# Patient Record
(demographics unavailable — no encounter records)

---

## 2025-03-12 NOTE — HISTORY OF PRESENT ILLNESS
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 There is a h/o of a dilated aortic root of 42mm by CT in 2018. An echo done 3/10/25 reveals an Ao of 39mm and mild AS with mild to moderate AI.  He denies CP or SOB.   EKG: NSR @ 54 with 1st degree AVB and a RBBB. No ST-Tw abnormalities. 3/12/25

## 2025-03-12 NOTE — PHYSICAL EXAM
[Well Developed] : well developed [Well Nourished] : well nourished [No Acute Distress] : no acute distress [Normal Conjunctiva] : normal conjunctiva [Normal Venous Pressure] : normal venous pressure [No Carotid Bruit] : no carotid bruit [Normal S1, S2] : normal S1, S2 [No Rub] : no rub [No Gallop] : no gallop [Clear Lung Fields] : clear lung fields [Good Air Entry] : good air entry [No Respiratory Distress] : no respiratory distress  [Soft] : abdomen soft [Non Tender] : non-tender [No Masses/organomegaly] : no masses/organomegaly [Normal Bowel Sounds] : normal bowel sounds [Normal Gait] : normal gait [No Edema] : no edema [No Cyanosis] : no cyanosis [No Clubbing] : no clubbing [No Varicosities] : no varicosities [No Rash] : no rash [No Skin Lesions] : no skin lesions [Moves all extremities] : moves all extremities [No Focal Deficits] : no focal deficits [Normal Speech] : normal speech [Alert and Oriented] : alert and oriented [Normal memory] : normal memory [de-identified] : CESAR

## 2025-03-12 NOTE — ASSESSMENT
[FreeTextEntry1] : Bradycardia- BB stopped in [facility], now getting HRs of 125, got LH.  He took 25 mg of Toprol yesterday and today. HR 54 today with a 1st degree AVB and RBBB. This is indicative of diffuse conduction dz.  Will resume BB at 12.5 mg daily. Pt to get a monitor thru [facility].   RBBB- Chronic  HTN- BP controlled

## 2025-03-21 NOTE — ASSESSMENT
[FreeTextEntry1] : An EKG was performed to evaluate for arrhythmia and ischemia.  Patient was told to take his metoprolol tonight and not to self-adjust his medications. He was told to call if HR <50  I encouraged continued risk factor reduction and gradual increase in aerobic activity as tolerated  20   minutes were spent discussing cardiac risk excluding procedure time

## 2025-03-21 NOTE — HISTORY OF PRESENT ILLNESS
[FreeTextEntry1] : 83 year male who skipped his Metoprolol last night when his MR was 50-60 bpm. He then took it this morning. He comes for Holter attachment and requests to know whether to take his Metoprolol tonight

## 2025-04-01 NOTE — CARDIOLOGY SUMMARY
Spoke with patient regarding new medication, RX sent to pharmacy. Patient denies any further questions    [de-identified] : 4/1/25 SR @ 97 bpm, RBBB EKG: NSR @ 54 with 1st degree AVB and a RBBB. No ST-Tw abnormalities. 3/12/25 [de-identified] : CONCLUSIONS:  1. Left ventricular cavity is small. Left ventricular systolic function is normal with an ejection fraction of 75 % by Lopez's method of disks.  2. No left ventricular hypertrophy.  3. Normal right ventricular cavity size and normal right ventricular systolic function. Tricuspid annular plane systolic excursion (TAPSE) is 3.0 cm (normal >=1.7 cm).  4. Normal left and right atrial size.  5. Mild aortic stenosis.  6. Mild to moderate aortic regurgitation.  7. No pericardial effusion seen.

## 2025-04-01 NOTE — DISCUSSION/SUMMARY
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 with c/o dizziness and bradycardia.  Metoprolol 50 mg daily was held.  Now resumed at 12.5 mg daily.  He denies any recurrent dizziness.  No history of presyncope/syncope.     1.  Bradycardia  Sinus rhythm with RBBB, LAFB on ECG today  No recurrent dizziness.  No presyncope/syncope  F/U LTM results.  Given presence of bifascicular block, recommend biannual ambulatory telemetry to monitor for progression of conduction disease.  He will f/u with Dr. Cordova.

## 2025-04-01 NOTE — CARDIOLOGY SUMMARY
[de-identified] : 4/1/25 SR @ 97 bpm, RBBB EKG: NSR @ 54 with 1st degree AVB and a RBBB. No ST-Tw abnormalities. 3/12/25 [de-identified] : CONCLUSIONS:  1. Left ventricular cavity is small. Left ventricular systolic function is normal with an ejection fraction of 75 % by Lopez's method of disks.  2. No left ventricular hypertrophy.  3. Normal right ventricular cavity size and normal right ventricular systolic function. Tricuspid annular plane systolic excursion (TAPSE) is 3.0 cm (normal >=1.7 cm).  4. Normal left and right atrial size.  5. Mild aortic stenosis.  6. Mild to moderate aortic regurgitation.  7. No pericardial effusion seen.

## 2025-04-01 NOTE — HISTORY OF PRESENT ILLNESS
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 with c/o dizziness and bradycardia.  Metoprolol 50 mg daily was held.  Now resumed at 12.5 mg daily.  He denies any recurrent dizziness.  No history of presyncope/syncope.     Cardiac Monitor was ordered by Dr. Colunga -- due to be completed 4/4/25

## 2025-04-01 NOTE — HISTORY OF PRESENT ILLNESS
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  Metoprolol 50 mg daily was held.  Now resumed at 12.5 mg daily.  He denies any recurrent dizziness.  No history of presyncope/syncope.     Cardiac Monitor was ordered by Dr. Colunga -- due to be completed 4/4/25

## 2025-04-01 NOTE — DISCUSSION/SUMMARY
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  Metoprolol 50 mg daily was held.  Now resumed at 12.5 mg daily.  He denies any recurrent dizziness.  No history of presyncope/syncope.     1.  Bradycardia  Sinus rhythm with RBBB, LAFB on ECG today  No recurrent dizziness.  No presyncope/syncope  F/U LTM results.  Given presence of bifascicular block, recommend biannual ambulatory telemetry to monitor for progression of conduction disease.  He will f/u with Dr. Cordova.

## 2025-04-01 NOTE — HISTORY OF PRESENT ILLNESS
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  Metoprolol 50 mg daily was held.  Now resumed at 12.5 mg daily.  He denies any recurrent dizziness.  No history of presyncope/syncope.     Cardiac Monitor was ordered by Dr. Colunga -- due to be completed 4/4/25

## 2025-04-01 NOTE — DISCUSSION/SUMMARY
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 with c/o dizziness and bradycardia.  Metoprolol 50 mg daily was held.  Now resumed at 12.5 mg daily.  He denies any recurrent dizziness.  No history of presyncope/syncope.     1.  Bradycardia  Sinus rhythm with RBBB, LAFB on ECG today  No recurrent dizziness.  No presyncope/syncope  F/U LTM results.  Given presence of bifascicular block, recommend biannual ambulatory telemetry to monitor for progression of conduction disease.  He will f/u with Dr. Cordova.

## 2025-04-01 NOTE — ADDENDUM
[FreeTextEntry1] : I, Miriam Carter, am scribing for and the presence of Dr. Farias the following sections: HPI, PMH,Family/social history, ROS, Physical Exam, Assessment / Plan. I, Jorge Farias, personally performed the services described in the documentation, reviewed the documentation recorded by the scribe in my presence and it accurately and completely records my words and actions.

## 2025-04-01 NOTE — CARDIOLOGY SUMMARY
[de-identified] : 4/1/25 SR @ 97 bpm, RBBB EKG: NSR @ 54 with 1st degree AVB and a RBBB. No ST-Tw abnormalities. 3/12/25 [de-identified] : CONCLUSIONS:  1. Left ventricular cavity is small. Left ventricular systolic function is normal with an ejection fraction of 75 % by Lopez's method of disks.  2. No left ventricular hypertrophy.  3. Normal right ventricular cavity size and normal right ventricular systolic function. Tricuspid annular plane systolic excursion (TAPSE) is 3.0 cm (normal >=1.7 cm).  4. Normal left and right atrial size.  5. Mild aortic stenosis.  6. Mild to moderate aortic regurgitation.  7. No pericardial effusion seen.

## 2025-04-18 NOTE — PHYSICAL EXAM
[Well Developed] : well developed [Well Nourished] : well nourished [No Acute Distress] : no acute distress [Normal Conjunctiva] : normal conjunctiva [Normal Venous Pressure] : normal venous pressure [No Carotid Bruit] : no carotid bruit [Normal S1, S2] : normal S1, S2 [No Rub] : no rub [No Gallop] : no gallop [Clear Lung Fields] : clear lung fields [Good Air Entry] : good air entry [No Respiratory Distress] : no respiratory distress  [Soft] : abdomen soft [Non Tender] : non-tender [No Masses/organomegaly] : no masses/organomegaly [Normal Bowel Sounds] : normal bowel sounds [Normal Gait] : normal gait [No Edema] : no edema [No Cyanosis] : no cyanosis [No Clubbing] : no clubbing [No Varicosities] : no varicosities [No Rash] : no rash [No Skin Lesions] : no skin lesions [Moves all extremities] : moves all extremities [No Focal Deficits] : no focal deficits [Normal Speech] : normal speech [Alert and Oriented] : alert and oriented [Normal memory] : normal memory [de-identified] : CESAR

## 2025-04-18 NOTE — ASSESSMENT
[FreeTextEntry1] : Bradycardia- BB stopped in [facility], now getting HRs of 125, got LH.  He took 25 mg of Toprol yesterday and today. HR 54 today with a 1st degree AVB and RBBB. This is indicative of diffuse conduction dz.  Will resume BB at 12.5 mg daily. Pt to get a monitor thru [facility].   RBBB- Chronic  HTN- BP controlled  1st degree AVB- resolved on lower dose of BB.

## 2025-04-18 NOTE — HISTORY OF PRESENT ILLNESS
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 There is a h/o of a dilated aortic root of 42mm by CT in 2018. An echo done 3/10/25 reveals an Ao of 39mm and mild AS with mild to moderate AI.  He denies CP or SOB.  4/18/25 No syncope.  On reduced BB. Takes Fish oils, MVI, Vit C,CoQ10, tumeric, Mg.  EKG: NSR @ 54 with 1st degree AVB and a RBBB. No ST-Tw abnormalities. 3/12/25